# Patient Record
Sex: FEMALE | Race: WHITE | NOT HISPANIC OR LATINO | Employment: OTHER | URBAN - METROPOLITAN AREA
[De-identification: names, ages, dates, MRNs, and addresses within clinical notes are randomized per-mention and may not be internally consistent; named-entity substitution may affect disease eponyms.]

---

## 2017-10-13 PROCEDURE — 87186 SC STD MICRODIL/AGAR DIL: CPT | Performed by: UROLOGY

## 2017-10-13 PROCEDURE — 87086 URINE CULTURE/COLONY COUNT: CPT | Performed by: UROLOGY

## 2017-10-13 PROCEDURE — 87077 CULTURE AEROBIC IDENTIFY: CPT | Performed by: UROLOGY

## 2018-06-11 PROCEDURE — 87186 SC STD MICRODIL/AGAR DIL: CPT | Performed by: UROLOGY

## 2018-06-11 PROCEDURE — 87077 CULTURE AEROBIC IDENTIFY: CPT | Performed by: UROLOGY

## 2018-06-11 PROCEDURE — 87086 URINE CULTURE/COLONY COUNT: CPT | Performed by: UROLOGY

## 2018-07-20 PROCEDURE — 87186 SC STD MICRODIL/AGAR DIL: CPT | Performed by: UROLOGY

## 2018-07-20 PROCEDURE — 87086 URINE CULTURE/COLONY COUNT: CPT | Performed by: UROLOGY

## 2018-07-20 PROCEDURE — 87077 CULTURE AEROBIC IDENTIFY: CPT | Performed by: UROLOGY

## 2018-09-10 PROCEDURE — 87086 URINE CULTURE/COLONY COUNT: CPT | Performed by: UROLOGY

## 2018-09-10 PROCEDURE — 87186 SC STD MICRODIL/AGAR DIL: CPT | Performed by: UROLOGY

## 2018-09-10 PROCEDURE — 87077 CULTURE AEROBIC IDENTIFY: CPT | Performed by: UROLOGY

## 2018-10-16 PROCEDURE — 87086 URINE CULTURE/COLONY COUNT: CPT | Performed by: UROLOGY

## 2018-10-16 PROCEDURE — 87186 SC STD MICRODIL/AGAR DIL: CPT | Performed by: UROLOGY

## 2018-10-16 PROCEDURE — 87077 CULTURE AEROBIC IDENTIFY: CPT | Performed by: UROLOGY

## 2018-10-29 PROCEDURE — 87086 URINE CULTURE/COLONY COUNT: CPT | Performed by: UROLOGY

## 2019-01-11 PROCEDURE — 87186 SC STD MICRODIL/AGAR DIL: CPT | Performed by: UROLOGY

## 2019-01-11 PROCEDURE — 87077 CULTURE AEROBIC IDENTIFY: CPT | Performed by: UROLOGY

## 2019-01-11 PROCEDURE — 87086 URINE CULTURE/COLONY COUNT: CPT | Performed by: UROLOGY

## 2019-03-04 PROCEDURE — 87077 CULTURE AEROBIC IDENTIFY: CPT | Performed by: UROLOGY

## 2019-03-04 PROCEDURE — 87186 SC STD MICRODIL/AGAR DIL: CPT | Performed by: UROLOGY

## 2019-03-04 PROCEDURE — 87086 URINE CULTURE/COLONY COUNT: CPT | Performed by: UROLOGY

## 2019-04-16 PROCEDURE — 87086 URINE CULTURE/COLONY COUNT: CPT | Performed by: UROLOGY

## 2019-04-16 PROCEDURE — 87186 SC STD MICRODIL/AGAR DIL: CPT | Performed by: UROLOGY

## 2019-04-16 PROCEDURE — 87077 CULTURE AEROBIC IDENTIFY: CPT | Performed by: UROLOGY

## 2019-05-10 PROCEDURE — 81001 URINALYSIS AUTO W/SCOPE: CPT | Performed by: UROLOGY

## 2019-05-10 PROCEDURE — 87186 SC STD MICRODIL/AGAR DIL: CPT | Performed by: UROLOGY

## 2019-05-10 PROCEDURE — 87086 URINE CULTURE/COLONY COUNT: CPT | Performed by: UROLOGY

## 2019-05-10 PROCEDURE — 87077 CULTURE AEROBIC IDENTIFY: CPT | Performed by: UROLOGY

## 2019-06-21 PROBLEM — N32.81 OAB (OVERACTIVE BLADDER): Status: ACTIVE | Noted: 2019-06-21

## 2019-06-21 PROBLEM — N39.0 RECURRENT UTI: Status: ACTIVE | Noted: 2019-06-21

## 2019-06-21 PROCEDURE — 87086 URINE CULTURE/COLONY COUNT: CPT | Performed by: UROLOGY

## 2019-06-21 PROCEDURE — 87077 CULTURE AEROBIC IDENTIFY: CPT | Performed by: UROLOGY

## 2019-06-21 PROCEDURE — 87186 SC STD MICRODIL/AGAR DIL: CPT | Performed by: UROLOGY

## 2020-10-08 PROCEDURE — 88305 TISSUE EXAM BY PATHOLOGIST: CPT | Performed by: OTOLARYNGOLOGY

## 2020-11-13 PROCEDURE — 88304 TISSUE EXAM BY PATHOLOGIST: CPT | Performed by: OTOLARYNGOLOGY

## 2020-11-13 PROCEDURE — 88311 DECALCIFY TISSUE: CPT | Performed by: OTOLARYNGOLOGY

## 2022-06-07 DIAGNOSIS — Z01.812 PRE-PROCEDURE LAB EXAM: Primary | ICD-10-CM

## 2022-06-08 ENCOUNTER — HOSPITAL ENCOUNTER (OUTPATIENT)
Dept: LAB | Age: 82
Discharge: HOME | End: 2022-06-08
Attending: INTERNAL MEDICINE

## 2022-06-08 ENCOUNTER — TELEPHONE (OUTPATIENT)
Dept: CARDIOLOGY | Age: 82
End: 2022-06-08

## 2022-06-08 DIAGNOSIS — Z01.812 PRE-PROCEDURE LAB EXAM: ICD-10-CM

## 2022-06-08 PROCEDURE — U0003 INFECTIOUS AGENT DETECTION BY NUCLEIC ACID (DNA OR RNA); SEVERE ACUTE RESPIRATORY SYNDROME CORONAVIRUS 2 (SARS-COV-2) (CORONAVIRUS DISEASE [COVID-19]), AMPLIFIED PROBE TECHNIQUE, MAKING USE OF HIGH THROUGHPUT TECHNOLOGIES AS DESCRIBED BY CMS-2020-01-R: HCPCS | Performed by: INTERNAL MEDICINE

## 2022-06-08 RX ORDER — LEVOTHYROXINE SODIUM 88 UG/1
88 TABLET ORAL DAILY
COMMUNITY

## 2022-06-08 RX ORDER — METOPROLOL SUCCINATE 25 MG/1
12.5 TABLET, EXTENDED RELEASE ORAL DAILY
COMMUNITY

## 2022-06-08 RX ORDER — ASPIRIN 325 MG
325 TABLET ORAL DAILY
COMMUNITY
End: 2022-06-10 | Stop reason: HOSPADM

## 2022-06-08 RX ORDER — METHENAMINE HIPPURATE 1000 MG/1
1 TABLET ORAL DAILY
COMMUNITY

## 2022-06-09 LAB
SARS-COV-2 RNA RESP QL NAA+PROBE: NOT DETECTED
SERVICE CMNT-IMP: NORMAL
SERVICE CMNT-IMP: NORMAL

## 2022-06-10 ENCOUNTER — HOSPITAL ENCOUNTER (OUTPATIENT)
Dept: CARDIOLOGY | Age: 82
Discharge: HOME | End: 2022-06-10
Attending: INTERNAL MEDICINE

## 2022-06-10 VITALS
BODY MASS INDEX: 20.76 KG/M2 | RESPIRATION RATE: 14 BRPM | HEIGHT: 67 IN | TEMPERATURE: 98.1 F | OXYGEN SATURATION: 100 % | SYSTOLIC BLOOD PRESSURE: 134 MMHG | HEART RATE: 64 BPM | DIASTOLIC BLOOD PRESSURE: 87 MMHG | WEIGHT: 132.28 LBS

## 2022-06-10 DIAGNOSIS — I48.0 PAF (PAROXYSMAL ATRIAL FIBRILLATION) (CMD): Primary | ICD-10-CM

## 2022-06-10 DIAGNOSIS — R93.1 ABNORMAL ECHOCARDIOGRAM: ICD-10-CM

## 2022-06-10 LAB
ALBUMIN SERPL-MCNC: 3.1 G/DL (ref 3.6–5.1)
ALBUMIN/GLOB SERPL: 0.9 {RATIO} (ref 1–2.4)
ALP SERPL-CCNC: 101 UNITS/L (ref 45–117)
ALT SERPL-CCNC: 14 UNITS/L
ANION GAP SERPL CALC-SCNC: 10 MMOL/L (ref 7–19)
AST SERPL-CCNC: 14 UNITS/L
BASOPHILS # BLD: 0.1 K/MCL (ref 0–0.3)
BASOPHILS NFR BLD: 1 %
BILIRUB SERPL-MCNC: 0.5 MG/DL (ref 0.2–1)
BUN SERPL-MCNC: 14 MG/DL (ref 6–20)
BUN/CREAT SERPL: 18 (ref 7–25)
CALCIUM SERPL-MCNC: 9 MG/DL (ref 8.4–10.2)
CHLORIDE SERPL-SCNC: 109 MMOL/L (ref 97–110)
CO2 SERPL-SCNC: 25 MMOL/L (ref 21–32)
CREAT SERPL-MCNC: 0.77 MG/DL (ref 0.51–0.95)
DEPRECATED RDW RBC: 49.8 FL (ref 39–50)
EOSINOPHIL # BLD: 0.3 K/MCL (ref 0–0.5)
EOSINOPHIL NFR BLD: 6 %
ERYTHROCYTE [DISTWIDTH] IN BLOOD: 13.8 % (ref 11–15)
FASTING DURATION TIME PATIENT: ABNORMAL H
GFR SERPLBLD BASED ON 1.73 SQ M-ARVRAT: 77 ML/MIN
GLOBULIN SER-MCNC: 3.6 G/DL (ref 2–4)
GLUCOSE SERPL-MCNC: 86 MG/DL (ref 70–99)
HCT VFR BLD CALC: 40 % (ref 36–46.5)
HGB BLD-MCNC: 13 G/DL (ref 12–15.5)
IMM GRANULOCYTES # BLD AUTO: 0 K/MCL (ref 0–0.2)
IMM GRANULOCYTES # BLD: 0 %
LYMPHOCYTES # BLD: 1.2 K/MCL (ref 1–4)
LYMPHOCYTES NFR BLD: 21 %
MAGNESIUM SERPL-MCNC: 2.2 MG/DL (ref 1.7–2.4)
MCH RBC QN AUTO: 31.6 PG (ref 26–34)
MCHC RBC AUTO-ENTMCNC: 32.5 G/DL (ref 32–36.5)
MCV RBC AUTO: 97.1 FL (ref 78–100)
MONOCYTES # BLD: 0.4 K/MCL (ref 0.3–0.9)
MONOCYTES NFR BLD: 7 %
NEUTROPHILS # BLD: 3.7 K/MCL (ref 1.8–7.7)
NEUTROPHILS NFR BLD: 65 %
NRBC BLD MANUAL-RTO: 0 /100 WBC
PLATELET # BLD AUTO: 245 K/MCL (ref 140–450)
POTASSIUM SERPL-SCNC: 3.8 MMOL/L (ref 3.4–5.1)
PROT SERPL-MCNC: 6.7 G/DL (ref 6.4–8.2)
RBC # BLD: 4.12 MIL/MCL (ref 4–5.2)
SODIUM SERPL-SCNC: 140 MMOL/L (ref 135–145)
WBC # BLD: 5.6 K/MCL (ref 4.2–11)

## 2022-06-10 PROCEDURE — 93319 3D ECHO IMG CGEN CAR ANOMAL: CPT

## 2022-06-10 PROCEDURE — 87040 BLOOD CULTURE FOR BACTERIA: CPT | Performed by: INTERNAL MEDICINE

## 2022-06-10 PROCEDURE — 10002800 HB RX 250 W HCPCS: Performed by: INTERNAL MEDICINE

## 2022-06-10 PROCEDURE — 13000001 HB PHASE II RECOVERY EA 30 MINUTES

## 2022-06-10 PROCEDURE — 80053 COMPREHEN METABOLIC PANEL: CPT | Performed by: INTERNAL MEDICINE

## 2022-06-10 PROCEDURE — 10002801 HB RX 250 W/O HCPCS: Performed by: INTERNAL MEDICINE

## 2022-06-10 PROCEDURE — 36415 COLL VENOUS BLD VENIPUNCTURE: CPT | Performed by: INTERNAL MEDICINE

## 2022-06-10 PROCEDURE — 99153 MOD SED SAME PHYS/QHP EA: CPT

## 2022-06-10 PROCEDURE — 83735 ASSAY OF MAGNESIUM: CPT | Performed by: INTERNAL MEDICINE

## 2022-06-10 PROCEDURE — 99152 MOD SED SAME PHYS/QHP 5/>YRS: CPT

## 2022-06-10 PROCEDURE — 85025 COMPLETE CBC W/AUTO DIFF WBC: CPT | Performed by: INTERNAL MEDICINE

## 2022-06-10 RX ORDER — MIDAZOLAM HYDROCHLORIDE 1 MG/ML
INJECTION, SOLUTION INTRAMUSCULAR; INTRAVENOUS PRN
Status: DISCONTINUED | OUTPATIENT
Start: 2022-06-10 | End: 2022-06-11 | Stop reason: HOSPADM

## 2022-06-10 RX ORDER — NALOXONE HCL 0.4 MG/ML
VIAL (ML) INJECTION
Status: DISCONTINUED
Start: 2022-06-10 | End: 2022-06-10 | Stop reason: WASHOUT

## 2022-06-10 RX ORDER — 0.9 % SODIUM CHLORIDE 0.9 %
2 VIAL (ML) INJECTION EVERY 12 HOURS SCHEDULED
Status: DISCONTINUED | OUTPATIENT
Start: 2022-06-10 | End: 2022-06-11 | Stop reason: HOSPADM

## 2022-06-10 RX ORDER — MIDAZOLAM HYDROCHLORIDE 1 MG/ML
INJECTION, SOLUTION INTRAMUSCULAR; INTRAVENOUS
Status: DISPENSED
Start: 2022-06-10 | End: 2022-06-10

## 2022-06-10 RX ORDER — FLUMAZENIL 0.1 MG/ML
INJECTION, SOLUTION INTRAVENOUS
Status: DISCONTINUED
Start: 2022-06-10 | End: 2022-06-10 | Stop reason: WASHOUT

## 2022-06-10 RX ADMIN — FENTANYL CITRATE 25 MCG: 50 INJECTION, SOLUTION INTRAMUSCULAR; INTRAVENOUS at 10:49

## 2022-06-10 RX ADMIN — MIDAZOLAM HYDROCHLORIDE 1 MG: 1 INJECTION, SOLUTION INTRAMUSCULAR; INTRAVENOUS at 10:45

## 2022-06-10 RX ADMIN — MIDAZOLAM HYDROCHLORIDE 1 MG: 1 INJECTION, SOLUTION INTRAMUSCULAR; INTRAVENOUS at 10:48

## 2022-06-10 RX ADMIN — FENTANYL CITRATE 25 MCG: 50 INJECTION, SOLUTION INTRAMUSCULAR; INTRAVENOUS at 10:45

## 2022-06-10 RX ADMIN — MIDAZOLAM HYDROCHLORIDE 1 MG: 1 INJECTION, SOLUTION INTRAMUSCULAR; INTRAVENOUS at 10:53

## 2022-06-10 RX ADMIN — FENTANYL CITRATE 25 MCG: 50 INJECTION, SOLUTION INTRAMUSCULAR; INTRAVENOUS at 10:43

## 2022-06-10 RX ADMIN — MIDAZOLAM HYDROCHLORIDE 1 MG: 1 INJECTION, SOLUTION INTRAMUSCULAR; INTRAVENOUS at 10:43

## 2022-06-10 ASSESSMENT — PAIN SCALES - GENERAL
PAINLEVEL_OUTOF10: 0

## 2022-06-15 LAB
BACTERIA BLD CULT: NORMAL
BACTERIA BLD CULT: NORMAL

## 2023-01-12 NOTE — H&P
As above.    Revoewed w/ pt and  nature of procedure  And risks bleed infection  PTx   + NPO   No h/o sedation issues of  complications

## 2023-01-13 RX ORDER — AMOXICILLIN 500 MG/1
500 CAPSULE ORAL
COMMUNITY

## 2023-01-23 ENCOUNTER — HOSPITAL ENCOUNTER (OUTPATIENT)
Dept: INTERVENTIONAL RADIOLOGY/VASCULAR | Facility: HOSPITAL | Age: 83
Discharge: HOME | End: 2023-01-23
Attending: INTERNAL MEDICINE | Admitting: INTERNAL MEDICINE
Payer: MEDICARE

## 2023-01-23 VITALS
SYSTOLIC BLOOD PRESSURE: 130 MMHG | HEART RATE: 63 BPM | HEIGHT: 66 IN | OXYGEN SATURATION: 96 % | DIASTOLIC BLOOD PRESSURE: 88 MMHG | BODY MASS INDEX: 20.89 KG/M2 | WEIGHT: 130 LBS | RESPIRATION RATE: 16 BRPM | TEMPERATURE: 98 F

## 2023-01-23 DIAGNOSIS — Z45.010 PACEMAKER BATTERY DEPLETION: ICD-10-CM

## 2023-01-23 PROCEDURE — 33228 REMV&REPLC PM GEN DUAL LEAD: CPT | Performed by: INTERNAL MEDICINE

## 2023-01-23 PROCEDURE — 33227 REMOVE&REPLACE PM GEN SINGL: CPT | Performed by: INTERNAL MEDICINE

## 2023-01-23 PROCEDURE — 0JH606Z INSERTION OF PACEMAKER, DUAL CHAMBER INTO CHEST SUBCUTANEOUS TISSUE AND FASCIA, OPEN APPROACH: ICD-10-PCS | Performed by: INTERNAL MEDICINE

## 2023-01-23 PROCEDURE — 99153 MOD SED SAME PHYS/QHP EA: CPT | Performed by: INTERNAL MEDICINE

## 2023-01-23 PROCEDURE — 0JPT0PZ REMOVAL OF CARDIAC RHYTHM RELATED DEVICE FROM TRUNK SUBCUTANEOUS TISSUE AND FASCIA, OPEN APPROACH: ICD-10-PCS | Performed by: INTERNAL MEDICINE

## 2023-01-23 PROCEDURE — 99152 MOD SED SAME PHYS/QHP 5/>YRS: CPT | Performed by: INTERNAL MEDICINE

## 2023-01-23 RX ORDER — LIDOCAINE HYDROCHLORIDE 10 MG/ML
INJECTION, SOLUTION EPIDURAL; INFILTRATION; INTRACAUDAL; PERINEURAL
Status: COMPLETED
Start: 2023-01-23 | End: 2023-01-23

## 2023-01-23 RX ORDER — CEFAZOLIN SODIUM/WATER 2 G/20 ML
SYRINGE (ML) INTRAVENOUS
Status: COMPLETED
Start: 2023-01-23 | End: 2023-01-23

## 2023-01-23 RX ORDER — SODIUM CHLORIDE 9 MG/ML
INJECTION, SOLUTION INTRAVENOUS
Status: DISCONTINUED | OUTPATIENT
Start: 2023-01-24 | End: 2023-01-23 | Stop reason: HOSPADM

## 2023-01-23 RX ORDER — CEFAZOLIN SODIUM/WATER 2 G/20 ML
2 SYRINGE (ML) INTRAVENOUS
Status: DISCONTINUED | OUTPATIENT
Start: 2023-01-23 | End: 2023-01-23 | Stop reason: HOSPADM

## 2023-01-23 RX ORDER — CHLORHEXIDINE GLUCONATE 4 G/100ML
30 SOLUTION TOPICAL
Status: COMPLETED | OUTPATIENT
Start: 2023-01-24 | End: 2023-01-23

## 2023-01-23 RX ORDER — MIDAZOLAM HYDROCHLORIDE 1 MG/ML
INJECTION INTRAMUSCULAR; INTRAVENOUS
Status: COMPLETED
Start: 2023-01-23 | End: 2023-01-23

## 2023-01-23 RX ORDER — VANCOMYCIN HYDROCHLORIDE 1 G/20ML
INJECTION, POWDER, LYOPHILIZED, FOR SOLUTION INTRAVENOUS
Status: COMPLETED
Start: 2023-01-23 | End: 2023-01-23

## 2023-01-23 RX ADMIN — CHLORHEXIDINE GLUCONATE 30 ML: 4 SOLUTION TOPICAL at 13:35:00

## 2023-01-23 NOTE — PROGRESS NOTES
S/p ppm generator change, left chest dressing soft C/D/I. Pt arrived via bed awake without complaints, denies pain 0/10 on pain scale. Bedrest completed, tolerating PO intake. Up in room without complaints, voiding without complaints. MD speaking with pt/spouse. Discharge instructions given/explained to pt/spouse, all questions answered, verbalized understanding. Tele dc'd, IV dc'd catheter intact, patient discharged via w/c instable condition.

## 2023-01-23 NOTE — PROGRESS NOTES
Pt arrived to room via bed with hob elevated talking denies pain 0/10 on pain scale. Dr Pedrito Bell at bedside speaking with pt/family. BP elevated, pt took bp meds this AM. Dr Pedrito Bell made aware, no new orders received. Pt tolerating PO intake. POC updated to pt/spouse, all questions answered, will continue to monitor.

## 2023-01-23 NOTE — DISCHARGE INSTRUCTIONS
Follow up in [address] [place] [address] Pacemaker clinic as scheduled. Restart Eliquis on Wednesday 1/25/2023. Continue all other home medications as directed. Remove left chest dressing in 24 hours. No heavy lifting anything heaver than 10 pounds. Do not lift left arm higher than shoulder. No driving for 24 hours. Home Care Instructions following Insertion of Permanent Pacemaker (PPM) or Internal Cardiac Defibrillator (ICD)    Activity:  Plan on resting and relaxing tonight and tomorrow. Avoid drinking alcohol for the next 24 hours. Do not lift anything over 10 pounds for the next 4 weeks. Consult your cardiologist about when you can return to work. What is normal:  A small amount of drainage on the bandage. Soreness at the procedure site. Special Instructions:  Keep the procedure site clean and dry. Do not submerge the procedure site in water or take a tub bath. You should shower after 4 days and wash the procedure site gently with soap and water. The strips of tape placed over your procedure site will gradually fall off over the next week. When you should NOTIFY your Physician:  If you have shortness of breath or a persistent cough. If you have chest pain (angina). If you have persistent pain at the procedure site. If you experience signs of a fever, temperature greater than 101 degrees, chills, infection (redness, swelling, thick yellow drainage or a foul odor from procedure site). If an ICD was inserted, call the if the device \"fires\". Other:  Call your physician's office for a follow up appointment. You should be seen in 7-10 days. If you had an ICD implanted, a procedure called an induction will be performed as an outpatient 1 month after the insertion. Keep the identification card in your wallet or with you at all times. Be knowledgeable about the settings of your device. You may use a cell phone, using the side opposite the device. There are no limits on microwave usage.   Power tools may be used but should be kept at least 1 foot away from the device. **STOP THESE ACTIVITIES IF YOU EXPERIENCE LIGHT-HEADEDNESS.**  Walk through metal detectors  Normally - do not hesitate. Consult with a cardiologist prior to any MRI testing. Notify all family and friends that you have a device implanted. If you experience loss of consciousness, they are to call 911. You may resume your present diet, unless otherwise specified by your physician. You may resume all of your medications, as prescribed, unless otherwise directed by your physician. A list of your medications was provided to you at discharge.

## 2023-01-23 NOTE — PROCEDURES
[facility]    PATIENT'S NAME: Prabhu CLEMENTE   ATTENDING PHYSICIAN: Home Beltrán M.D. OPERATING PHYSICIAN: Home Beltrán M.D. PATIENT ACCOUNT#:   [de-identified]    LOCATION:  [address]  MEDICAL RECORD #:   [id number]       YOB: 1940  ADMISSION DATE:       01/23/2023      OPERATION DATE:  01/23/2023    CARDIAC PROCEDURE TRANSCRIPTION      PACEMAKER REPLACEMENT    PREOPERATIVE DIAGNOSIS:    POSTOPERATIVE DIAGNOSIS:    PROCEDURE PERFORMED:  Replacement of [employer] dual-chamber pacemaker. HISTORY:  This is an 79year-old patient who had a pacemaker implanted in 2013. It is now at [facility]. She has had rising thresholds in the ventricular lead. Risks were reviewed with the patient preprocedure including the possibility of lead change. DESCRIPTION OF PROCEDURE:  Patient brought to the EP laboratory in a fasting state. She was assessed ASA 2, Mallampati 2. Blood pressure, heart rate, telemetry, O2, CO2 would all be monitored. She would be observed by myself and the nursing staff. Sedation would begin at 1433. Under my direct supervision, she received 4 of Versed and 100 of fentanyl in divided doses. She would tolerate the procedure well and would be finished at 1520. A venogram was done and showed patency of the left subclavian system. There were some late collaterals and visualization of the innominate was not clear. Her left chest was prepped and draped in the usual sterile fashion. After local anesthesia, an incision was made on the old generator. It would be removed from the pocket and returned to the vendor, Curt. This was a [address], serial [id number]. The leads were examined and tested. The atrial lead was a [id number], serial [id number]. The ventricular lead was an 1888TC-58, serial [id number]. With the rise in threshold, extensive examination was done and the insulation and leads seemed very intact.   It was tested trying to produce noise and we could not do any, tagging and manipulation of the lead. Sensing was appropriate. The pocket was modified to accommodate the new generator. It was examined for bleeding and clots, and none were evident. It was irrigated with antibiotic solution. The leads were attached to a [address] [facility] [employer] serial [id number]. Testing of the lead was done including manipulation to look for noise. It was placed in the pocket with excess lead posterior to it and sutured in place. The leads were tested. P waves were 4.9 with impedance of 380 and a threshold of 0.5 V at 0.4 milliseconds. RV waves were 5.7, impedance was 490, threshold was 2.25 V, 1.5 V milliseconds which is what was preop. At this time, it was elected to keep the chronic lead. In this 79-year-old patient, she is not depended, paces about 5% of the time in the V.  Current device at these outputs should last greater than 8 years and it was felt to be more beneficial than the risks of making this a more invasive procedure. The pocket was closed with interrupted 2-0 Vicryl and reinforced with running 3-0 Vicryl. The skin was closed with subcuticular 4-0 Dexon and reinforced with Steri-Strips. The patient tolerated the procedure well. IMPRESSION:  Successful replacement of [employer] atrioventricular pacemaker.     Dictated By Bill Helton M.D.  d: 01/23/2023 15:25:13  t: 01/23/2023 17:35:24  Namita Wade [id number]  [facility]/